# Patient Record
Sex: FEMALE | Race: WHITE | NOT HISPANIC OR LATINO | Employment: UNEMPLOYED | URBAN - NONMETROPOLITAN AREA
[De-identification: names, ages, dates, MRNs, and addresses within clinical notes are randomized per-mention and may not be internally consistent; named-entity substitution may affect disease eponyms.]

---

## 2021-04-16 ENCOUNTER — OFFICE VISIT CONVERTED (OUTPATIENT)
Dept: NEUROSURGERY | Facility: CLINIC | Age: 61
End: 2021-04-16
Attending: PHYSICIAN ASSISTANT

## 2021-05-11 NOTE — H&P
History and Physical      Patient Name: Anai Burden   Patient ID: [id number]   Sex: Female   YOB: 1960    Primary Care Provider: Elsa GRAHAM   Referring Provider: Elsa GRAHAM    Visit Date: April 16, 2021    Provider: Purvi Lilly PA-C   Location: [facility] [place]   Location Address: [address] [place] [address] [place], [place]  [id number]   Location Phone: [phone]          Chief Complaint     Low back pain and left leg weakness.       History Of Present Illness  The patient is a 60 year old /White female, who presents on referral from Elsa GRAHAM, for a neurosurgical evaluation of low back pain, right leg pain, and left leg pain.   The right leg pain involves the right foot in a nonspecific distribution. The left leg pain involves the left foot in a nonspecific distribution. The pain developed gradually around 6 months ago, but getting worse It is 8/10 in severity has an aching and a sharp quality and radiates into the bilateral foot in a nonspecific distribution. The pain has been constant. The pain tends to be maximal at no specific time, but waxes and wanes in severity throughout the day. The patient states the pain is aggravated by prolonged standing, walking long distances, staying in one position for extended periods, bending, and lifting. No alleviating factors are reported.   She also reports paresthesias in the right foot to include all of the toes and right leg feels weak at times. She denies urinary frequency and trouble emptying bladder. The patient's past medical history is notable for history of cervical and lumbar surgery. History of ACDF. Left L3/4 MID and right L4/5 MIL several years ago.   RECENT INTERVENTIONS:  She has been previously treated with chiropractic management and tramadol. The chiropractic treatments were transiently effective.   INFORMATION REVIEWED:  The following information was reviewed: radiology reports  and images. MRI of the lumbar spine and from [facility] (not on PACS) from June 2020 revealed left foraminal disc protrusion at L4/5 was the most notable finding with ddd at several levels.       Past Medical History  Anxiety; Carpal tunnel syndrome; Cervicalgia; Depression; Lumbago/low back pain; Lumbar Spinal Stenosis; Sciatica         Past Surgical History  Carpal Tunnel Release; Cervical fusion using anterior approach; Cholecystectomy; Hysterectomy; Lumbar laminectomy; Minimally invasive Discectomy         Medication List  gabapentin 300 mg oral capsule; tramadol 50 mg oral tablet; Trelegy Ellipta 100-62.5-25 mcg inhalation blister with device         Allergy List  hydrocodone-acetaminophen         Family Medical History  Diabetes, unspecified type; Heart Attack (MI)         Social History  Tobacco (Former)         Review of Systems  · Constitutional  o Admits  o : fatigue, weight gain  o Denies  o : chills, excessive sweating, fever, sycope/passing out, weight loss  · Eyes  o Denies  o : changes in vision, blurry vision, double vision  · HENT  o Admits  o : loss of hearing, seasonal allergies  o Denies  o : ringing in the ears, ear aches, sore throat, nasal congestion, sinus pain, nose bleeds  · Cardiovascular  o Denies  o : blood clots, swollen legs, anemia, easy burising or bleeding, transfusions  · Respiratory  o Admits  o : shortness of breath, COPD  o Denies  o : dry cough, productive cough, pneumonia  · Gastrointestinal  o Denies  o : difficulty swallowing, reflux  · Genitourinary  o Denies  o : incontinence  · Neurologic  o Admits  o : tremor  o Denies  o : headache, seizure, stroke, loss of balance, falls, dizziness/vertigo, difficulty with sleep, numbness/tingling/paresthesia , difficulty with coordination, difficulty with dexterity, weakness  · Musculoskeletal  o Admits  o : joint pain, weakness, sciatica, pain radiating in leg, low back pain  o Denies  o : neck stiffness/pain, swollen lymph  "nodes, muscle aches, pain radiating in arm  · Endocrine  o Denies  o : diabetes, thyroid disorder  · Psychiatric  o Denies  o : anxiety, depression  · All Others Negative      Vitals  Date Time BP Position Site L\R Cuff Size HR RR TEMP (F) WT  HT  BMI kg/m2 BSA m2 O2 Sat FR L/min FiO2 HC       04/16/2021 10:46 AM        96.3 191lbs 0oz 5'  5\" 31.78 1.99             Physical Examination     Motor 5/5  DTR 1/4  positive SLR on right   gait and station are wnl  ttp over the right SI joint           Assessment  · Degeneration of lumbar intervertebral disc     722.52/M51.36  · Lumbago/low back pain     724.3/M54.40  · Paresthesia     782.0/R20.2  · Radiculopathy, lumbosacral     724.4/M54.16      Plan  · Orders  o VIKTORIA REPORT (KASPR) - - 04/16/2021  o MRI lumbar spine w/w/o contrst (69061) - 724.3/M54.40, 724.4/M54.16, 782.0/R20.2, 722.52/M51.36 - 04/16/2021   [place]  o BUN (86221) - - 04/16/2021  o Creatinine blood (43296) - - 04/16/2021  · Medications  o tramadol 50 mg oral tablet   SIG: take 1-2 tablets by oral route TID as needed for pain   DISP: (84) Tablet with 0 refills  Adjusted on 04/16/2021     o Medications have been Reconciled  o Transition of Care or Provider Policy  · Instructions  o Encouraged to follow-up with Primary Care Provider for preventative care.  o The ROS and the PFSH were reviewed at today's visit.  o I have discussed the risks and benefits of surgery versus physical therapy, epidural steroids, and other conservative forms of treatment.  o Call or return to office if symptoms worsen or persist.            Electronically Signed by: Purvi Lilly PA-C -Author on April 16, 2021 11:10:13 AM  "

## 2021-05-14 VITALS — HEIGHT: 65 IN | WEIGHT: 191 LBS | TEMPERATURE: 96.3 F | BODY MASS INDEX: 31.82 KG/M2

## 2022-04-26 ENCOUNTER — TELEPHONE (OUTPATIENT)
Dept: NEUROSURGERY | Facility: CLINIC | Age: 62
End: 2022-04-26

## 2022-04-26 NOTE — TELEPHONE ENCOUNTER
Caller: GEREMIAS ROBERT     Relationship to patient: SELF    Best call back number: [phone]    Chief complaint: LOW BACK PAIN     Type of visit: FOLLOW UP EXT     Requested date: ASAP     If rescheduling, when is the original appointment: N/A     Additional notes: PATIENT IS CALLING REQUESTING TO SCHEDULE AN APPT FOR WORSENING LOW BACK PAIN. LAST SEEN 4/16/21 BY STORMY MALIN. PATIENT STATED SHE HAD AN MRI LUMBAR SPINE LAST YEAR @ [facility].     CALLED [facility], [facility] FOR THEM TO FAX MRI REPORT TO THE HUB. PATIENT STATED SHE HAS THE DISC.     HUB OKAY TO SCHED W MID LEVEL?

## 2022-05-02 ENCOUNTER — TELEPHONE (OUTPATIENT)
Dept: NEUROSURGERY | Facility: CLINIC | Age: 62
End: 2022-05-02

## 2022-05-02 NOTE — TELEPHONE ENCOUNTER
PATIENT CALLED TO RESCHEDULE HER FOLLOW UP APPT WITH STORMY DUONG. PATIENT STATED IT IS HER DAUGHTERS GRADUATION AND SHE HAS FAMILY COMING IN TOWN. SHE REQUESTED TO RESCHEDULE FOR June. RESCHEDULED HER TO 6/2/22.     DOCUMENTING PER HUB PROTOCOL.

## 2022-07-07 ENCOUNTER — OFFICE VISIT (OUTPATIENT)
Dept: NEUROSURGERY | Facility: CLINIC | Age: 62
End: 2022-07-07

## 2022-07-07 VITALS — HEIGHT: 65 IN | WEIGHT: 197 LBS | BODY MASS INDEX: 32.82 KG/M2

## 2022-07-07 DIAGNOSIS — M70.61 TROCHANTERIC BURSITIS OF BOTH HIPS: ICD-10-CM

## 2022-07-07 DIAGNOSIS — M70.62 TROCHANTERIC BURSITIS OF BOTH HIPS: ICD-10-CM

## 2022-07-07 DIAGNOSIS — Z98.890 HX OF LAMINECTOMY: ICD-10-CM

## 2022-07-07 DIAGNOSIS — M51.26 LUMBAR DISC HERNIATION: ICD-10-CM

## 2022-07-07 DIAGNOSIS — M47.27 OSTEOARTHRITIS OF SPINE WITH RADICULOPATHY, LUMBOSACRAL REGION: Primary | ICD-10-CM

## 2022-07-07 PROCEDURE — 99214 OFFICE O/P EST MOD 30 MIN: CPT | Performed by: NURSE PRACTITIONER

## 2022-07-07 RX ORDER — ARIPIPRAZOLE 10 MG/1
TABLET ORAL
COMMUNITY
Start: 2022-06-14

## 2022-07-07 RX ORDER — CELECOXIB 200 MG/1
CAPSULE ORAL
COMMUNITY
Start: 2022-06-14

## 2022-07-07 RX ORDER — PANTOPRAZOLE SODIUM 40 MG/1
TABLET, DELAYED RELEASE ORAL
COMMUNITY
Start: 2022-06-14

## 2022-07-07 RX ORDER — TRAMADOL HYDROCHLORIDE 50 MG/1
TABLET ORAL
COMMUNITY
Start: 2022-06-28

## 2022-07-07 RX ORDER — BUPROPION HYDROCHLORIDE 75 MG/1
TABLET ORAL
COMMUNITY
Start: 2022-05-23

## 2022-07-07 RX ORDER — GABAPENTIN 300 MG/1
CAPSULE ORAL
COMMUNITY
Start: 2022-06-21

## 2022-07-07 NOTE — PROGRESS NOTES
Chief Complaint  Back Pain and Leg Pain    Subjective          Anai Burden who is a 62 y.o. year old female who presents to [facility] for follow up worsening low back and leg apin.     She was last seen in our office in April of last year. She has a prior history of left MID L3/4 and right MIL L4/5 several years ago. An MRI Lumbar Spine was ordered at that time, demonstrating multilevel degenerative changes with a left paracentral disc protrusion at L4/5. She never followed up with our office following the imaging.     Her pain has worsened over the past month. Pain will radiate into the right leg, into the lateral thigh, calf, and side of the foot. Has some pain into the left leg though not as significant. She has concerns of weakness in the legs.     Her pain is primarily in the low back. Described as pressure. Prolonged sitting, standing and walking make her pain worse, bending and twisting. Rest will relieve her pain.     She is taking Tramadol and Gabapentin, which are no longer as effective as it once was.      Interval History per DELICIA Whitehead 4/16/21    The patient is a 60 year old /White female, who presents on referral from Elsa GRAHAM, for a neurosurgical evaluation of low back pain, right leg pain, and left leg pain.   The right leg pain involves the right foot in a nonspecific distribution. The left leg pain involves the left foot in a nonspecific distribution. The pain developed gradually around 6 months ago, but getting worse It is 8/10 in severity has an aching and a sharp quality and radiates into the bilateral foot in a nonspecific distribution. The pain has been constant. The pain tends to be maximal at no specific time, but waxes and wanes in severity throughout the day. The patient states the pain is aggravated by prolonged standing, walking long distances, staying in one position for extended periods, bending, and lifting. No  "alleviating factors are reported.   She also reports paresthesias in the right foot to include all of the toes and right leg feels weak at times. She denies urinary frequency and trouble emptying bladder. The patient's past medical history is notable for history of cervical and lumbar surgery. History of ACDF. Left L3/4 MID and right L4/5 MIL several years ago.   RECENT INTERVENTIONS:  She has been previously treated with chiropractic management and tramadol. The chiropractic treatments were transiently effective.   INFORMATION REVIEWED:  The following information was reviewed: radiology reports and images. MRI of the lumbar spine and from [facility] (not on PACS) from June 2020 revealed left foraminal disc protrusion at L4/5 was the most notable finding with ddd at several levels.     Recent Interventions: Tramadol, gabapentin      Review of Systems   Musculoskeletal: Positive for arthralgias, back pain and bursitis.   Neurological: Positive for weakness and numbness.   All other systems reviewed and are negative.       Objective   Vital Signs:   Ht 165.1 cm (65\")   Wt 89.4 kg (197 lb)   BMI 32.78 kg/m²       Physical Exam  Vitals reviewed.   Constitutional:       Appearance: Normal appearance.   Musculoskeletal:      Lumbar back: No tenderness. Negative right straight leg raise test and negative left straight leg raise test.      Right hip: Tenderness (trochanteric bursa TTP) present. Normal range of motion.      Left hip: Tenderness (trochanteric bursa TTP) present. Normal range of motion.   Neurological:      Mental Status: She is alert and oriented to person, place, and time.      Gait: Gait is intact.      Deep Tendon Reflexes: Strength normal.      Reflex Scores:       Patellar reflexes are 0 on the right side and 0 on the left side.       Achilles reflexes are 0 on the right side and 0 on the left side.       Neurologic Exam     Mental Status   Oriented to person, place, and time.   Level of " consciousness: alert    Motor Exam   Muscle bulk: normal  Overall muscle tone: normal    Strength   Strength 5/5 throughout.     Sensory Exam   Sensory deficit distribution on right: L5    Gait, Coordination, and Reflexes     Gait  Gait: normal    Reflexes   Right patellar: 0  Left patellar: 0  Right achilles: 0  Left achilles: 0  Right ankle clonus: absent  Left ankle clonus: absent       Result Review :       Data reviewed: Radiologic studies MRI Lumbar Spine on 5/12/21 at [facility] personally reviewed. Multilevel degenerative changes. Prior laminectomy L3/4 on the left and L4/5 on the right. There is a left parcentral disc protrusion at L4/5 with severe left lateral recess and foraminal stenosis. This is the most notable finding.          Assessment and Plan    Diagnoses and all orders for this visit:    1. Osteoarthritis of spine with radiculopathy, lumbosacral region (Primary)  -     MRI Lumbar Spine With & Without Contrast; Future    2. Lumbar disc herniation    3. Hx of laminectomy  -     MRI Lumbar Spine With & Without Contrast; Future    4. Trochanteric bursitis of both hips    Pt with history of prior lumbar spine surgeries, presenting with worsening pain in the back and right leg in an L5 distribution. We reviewed her MRI Lumbar Spine from over a year ago, demonstrating a left sided disc herniation at L4/5. We discussed that this would not explain her current symptoms well. Will proceed with MRI Lumbar Spine w/wo to evaluate for surgical intervention. She will follow up in 6 weeks.       Follow Up   Return in about 6 weeks (around 8/18/2022).  Patient was given instructions and counseling regarding her condition or for health maintenance advice.       -MRI Lumbar Spine w/wo  -Follow up in 6 weeks

## 2022-07-11 ENCOUNTER — TELEPHONE (OUTPATIENT)
Dept: NEUROSURGERY | Facility: CLINIC | Age: 62
End: 2022-07-11

## 2022-07-11 DIAGNOSIS — M51.26 LUMBAR DISC HERNIATION: Primary | ICD-10-CM

## 2022-08-09 DIAGNOSIS — M51.26 LUMBAR DISC HERNIATION: ICD-10-CM

## 2022-08-09 DIAGNOSIS — M47.27 OSTEOARTHRITIS OF SPINE WITH RADICULOPATHY, LUMBOSACRAL REGION: ICD-10-CM

## 2022-08-09 DIAGNOSIS — Z98.890 HX OF LAMINECTOMY: ICD-10-CM

## 2022-08-18 ENCOUNTER — OFFICE VISIT (OUTPATIENT)
Dept: NEUROSURGERY | Facility: CLINIC | Age: 62
End: 2022-08-18

## 2022-08-18 VITALS — BODY MASS INDEX: 33.15 KG/M2 | WEIGHT: 199 LBS | HEIGHT: 65 IN

## 2022-08-18 DIAGNOSIS — Z98.890 HX OF LAMINECTOMY: ICD-10-CM

## 2022-08-18 DIAGNOSIS — M70.61 TROCHANTERIC BURSITIS OF BOTH HIPS: ICD-10-CM

## 2022-08-18 DIAGNOSIS — M47.27 OSTEOARTHRITIS OF SPINE WITH RADICULOPATHY, LUMBOSACRAL REGION: ICD-10-CM

## 2022-08-18 DIAGNOSIS — M70.62 TROCHANTERIC BURSITIS OF BOTH HIPS: ICD-10-CM

## 2022-08-18 DIAGNOSIS — M54.16 LUMBAR RADICULOPATHY: Primary | ICD-10-CM

## 2022-08-18 PROCEDURE — 99214 OFFICE O/P EST MOD 30 MIN: CPT | Performed by: NURSE PRACTITIONER

## 2022-08-18 NOTE — PROGRESS NOTES
Chief Complaint  Back Pain, Hip Pain, and Leg Pain    Subjective          Anai Burden who is a 62 y.o. year old female who presents to [facility] for follow up of low back and right leg pain. MRI Lumbar Spine.    MRI Lumbar Spine on 8/8/22 at [place] personally reviewed. Disc bulge combined with facet arthropathy at L3/4 reuslting in moderate right neuralforaminal and lateral recess narrowing without significant canal stenosis. At L4/5 there are changes from prior laminectomy and moderate left neural foraminal narowing.    Pt's pain is primarily in the right hip radiating into the lateral thigh. Will occasional radiate into lateral calf. Pain is rather severe. She is limited in moving and rotating the hip, as well as raising the leg.     She continues Tramadol and Gabapentin, which are no longer as effective as they once were.       Interval History 7/7/22     Anai Burden who is a 62 y.o. year old female who presents to [facility] for follow up worsening low back and leg apin.      She was last seen in our office in April of last year. She has a prior history of left MID L3/4 and right MIL L4/5 several years ago. An MRI Lumbar Spine was ordered at that time, demonstrating multilevel degenerative changes with a left paracentral disc protrusion at L4/5. She never followed up with our office following the imaging.      Her pain has worsened over the past month. Pain will radiate into the right leg, into the lateral thigh, calf, and side of the foot. Has some pain into the left leg though not as significant. She has concerns of weakness in the legs.      Her pain is primarily in the low back. Described as pressure. Prolonged sitting, standing and walking make her pain worse, bending and twisting. Rest will relieve her pain.      She is taking Tramadol and Gabapentin, which are no longer as effective as it once  "was.     Recent Interventions: Tramadol and Gabapentin      Review of Systems   Musculoskeletal: Positive for arthralgias, back pain, gait problem and bursitis.   Neurological: Positive for weakness and numbness.   All other systems reviewed and are negative.       Objective   Vital Signs:   Ht 165.1 cm (65\")   Wt 90.3 kg (199 lb)   BMI 33.12 kg/m²       Physical Exam  Vitals reviewed.   Constitutional:       Appearance: Normal appearance.   Musculoskeletal:      Lumbar back: No tenderness. Negative right straight leg raise test and negative left straight leg raise test.      Right hip: Tenderness (trochanteric bursa TTP) present. Normal range of motion.      Left hip: Tenderness (trochanteric bursa TTP) present. Normal range of motion.   Neurological:      Mental Status: She is alert and oriented to person, place, and time.      Gait: Gait is intact.      Deep Tendon Reflexes: Strength normal.      Reflex Scores:       Patellar reflexes are 0 on the right side and 0 on the left side.       Achilles reflexes are 0 on the right side and 0 on the left side.       Neurologic Exam     Mental Status   Oriented to person, place, and time.   Level of consciousness: alert    Motor Exam   Muscle bulk: normal  Overall muscle tone: normal    Strength   Strength 5/5 throughout.     Sensory Exam   Light touch normal.     Gait, Coordination, and Reflexes     Gait  Gait: normal    Reflexes   Right patellar: 0  Left patellar: 0  Right achilles: 0  Left achilles: 0  Right ankle clonus: absent  Left ankle clonus: absent       Result Review :       Data reviewed: Radiologic studies MRI Lumbar Spine on 8/8/22 at [place] personally reviewed. Disc bulge combined with facet arthropathy at L3/4 reuslting in moderate right neuralforaminal and lateral recess narrowing without significant canal stenosis. At L4/5 there are changes from prior laminectomy and moderate left neural foraminal narowing.          Assessment and Plan    Diagnoses " and all orders for this visit:    1. Lumbar radiculopathy (Primary)  -     Cancel: Ambulatory Referral to Pain Management  -     Ambulatory Referral to Pain Management    2. Trochanteric bursitis of both hips  -     Ambulatory Referral to Pain Management    3. Osteoarthritis of spine with radiculopathy, lumbosacral region  -     Cancel: Ambulatory Referral to Pain Management  -     Ambulatory Referral to Pain Management    4. Hx of laminectomy  -     Cancel: Ambulatory Referral to Pain Management  -     Ambulatory Referral to Pain Management    We reviewed her MRI Lumbar Spine. She has some foraminal and lateral recess narrowing on the right at L3/4, though I am unsure this provides a great explanation for the severity of her pain. She has limited ROM in the hip and tenderness at the trochanteric bursa. She has received injections in this area with good relief. Will refer to pain management to discuss epidural vs trochanteric bursa injections, as well as medication management. Should hip pain persist, she may need to get back to Orthopedics for further evaluation. She will follow up as needed.       Follow Up   Return if symptoms worsen or fail to improve.  Patient was given instructions and counseling regarding her condition or for health maintenance advice.       -Referral to Pain management for injection  -Follow up as needed

## 2023-02-08 NOTE — TELEPHONE ENCOUNTER
Lab ordered please fax to [facility]   Rituxan Counseling:  I discussed with the patient the risks of Rituxan infusions. Side effects can include infusion reactions, severe drug rashes including mucocutaneous reactions, reactivation of latent hepatitis and other infections and rarely progressive multifocal leukoencephalopathy.  All of the patient's questions and concerns were addressed.

## 2023-04-14 ENCOUNTER — TELEPHONE (OUTPATIENT)
Dept: ORTHOPEDIC SURGERY | Facility: CLINIC | Age: 63
End: 2023-04-14
Payer: MEDICARE

## 2023-04-14 NOTE — TELEPHONE ENCOUNTER
Comments     INCOMING SELF REFERRAL FOR 2ND OPINION TO [facility] FOR FROM MRI REPORT 12/14/22 IN CHART  1. MACERATED APPEARANCE OF THE ANTERIOR ANTEROSUPERIOR LABRUM  2. MILD EDEMA INVOLVING THE AIIS ORIGIN OF THE RECTUS FEMORIS WHICH COULD REPRESENT LOW GRADE PARTIAL TEAR     PATIENT HAS SEEN [facility] IN [place], [place] UP TO 02/2023 APPROX   NO HX OF SX - PATIENT CALLED DR. MOODY TO HAVE RECORDS SENT. RECORDS ARE IN MEDIA FROM 12/2022 - SENT TO SCHED REVIEW RE: 2ND OPINION         DR OHWELL IS THIS SOMETHING YOU WILL TREAT?    Please advise

## 2023-05-03 DIAGNOSIS — M25.552 BILATERAL HIP PAIN: Primary | ICD-10-CM

## 2023-05-03 DIAGNOSIS — M25.551 BILATERAL HIP PAIN: Primary | ICD-10-CM

## 2023-05-04 ENCOUNTER — OFFICE VISIT (OUTPATIENT)
Dept: ORTHOPEDIC SURGERY | Facility: CLINIC | Age: 63
End: 2023-05-04
Payer: MEDICARE

## 2023-05-04 ENCOUNTER — HOSPITAL ENCOUNTER (OUTPATIENT)
Dept: GENERAL RADIOLOGY | Facility: HOSPITAL | Age: 63
Discharge: HOME | End: 2023-05-04
Payer: MEDICARE

## 2023-05-04 VITALS — WEIGHT: 193 LBS | HEIGHT: 65 IN | TEMPERATURE: 98.6 F | BODY MASS INDEX: 32.15 KG/M2

## 2023-05-04 DIAGNOSIS — M25.551 BILATERAL HIP PAIN: ICD-10-CM

## 2023-05-04 DIAGNOSIS — M25.551 RIGHT HIP PAIN: Primary | ICD-10-CM

## 2023-05-04 DIAGNOSIS — M25.552 LEFT HIP PAIN: ICD-10-CM

## 2023-05-04 DIAGNOSIS — M25.552 BILATERAL HIP PAIN: ICD-10-CM

## 2023-05-04 PROCEDURE — 73521 X-RAY EXAM HIPS BI 2 VIEWS: CPT

## 2023-05-04 NOTE — PROGRESS NOTES
"Chief Complaint  Pain and Establish Care of the Right Hip    Subjective    History of Present Illness      Anai Burden is a 63 y.o. female who presents to [facility] for chronic low back pain and bilateral hip pain.  History of Present Illness this is a patient who has been having increasing pain and discomfort on both her hips.  The right side is more symptomatic than the left.  She has had increasing pain and discomfort over the past few months.  She states that the pain is 6 on a scale of 1-10.  The pain is associated with discomfort on rotation of the hips.  She has difficulty going up and down the steps.  She has a sense of clicking and popping in the hip joint.  Symptoms have become worse over the past 5 months or so.  She rates her pain as an 8 on a scale of 1-10.  There are no risk factors for avascular necrosis.  Symptoms are worse when she is laying in a decubitus position.  She has been using meloxicam for pain and discomfort.  She has also had a very long history of chronic low back pain.  She has had multiple surgical intervention for the lumbar spine.  She has also had LESI in the pain clinic which do help her symptoms to some degree.  She is here for second opinion and this is a very complex setting.  She has been initially seen by Dr. Raj White and by Dr. Theo Kumari as well.  Pain Location: BILATERAL hip  Radiation: From the lumbar spine into the hip buttock and thigh.  Quality: dull, aching  Intensity/Severity: moderate to severe  Duration: 5 months  Progression of symptoms: yes, progressive worsening  Onset quality: gradual   Timing: intermittent  Aggravating Factors: kneeling, rising after sitting  Alleviating Factors: NSAIDs  Previous Episodes: yes  Associated Symptoms: clicking/popping  ADLs Affected: work related activities, recreational activities/sports  Previous Treatment: NSAIDs       Objective   Vital Signs:   Temp 98.6 °F (37 °C)   Ht 165.1 cm (65\")  "  Wt 87.5 kg (193 lb)   BMI 32.12 kg/m²     Physical Exam  Physical Exam  Vitals signs and nursing note reviewed.   Constitutional:       Appearance: Normal appearance.   Pulmonary:      Effort: Pulmonary effort is normal.   Skin:     General: Skin is warm and dry.      Capillary Refill: Capillary refill takes less than 2 seconds.   Neurological:      General: No focal deficit present.      Mental Status: He is alert and oriented to person, place, and time. Mental status is at baseline.   Psychiatric:         Mood and Affect: Mood normal.         Behavior: Behavior normal.         Thought Content: Thought content normal.         Judgment: Judgment normal.     Ortho Exam   Bilateral SI JOINT: Mild tenderness is present dorsally over the SI joint. Figure of 4 sign is positive. There is mild spasm present in the erector spinae muscle. Flexion and extension are associated with discomfort. Deep tendon reflexes are intact and symmetrical on both lower extremities. Pain radiates into the buttock on extension of the hip. No evidence of cauda equina syndrome. No motor or sensory deficit is noted. There is no bowel or bladder involvement.  Bilateral hip (gtb): Skin and soft tissues over the greater trochanteric bursa are tender upon palpation, along with IT band tenderness. Cross body adduction is negative. Internal and external rotations are bothersome and cause tenderness over the greater trochanter. There is no clinical deformity and no shortening. She does have a limp upon walking. There is piriformis tightness and there  is capsular tightness with any rotation. Dorsalis pedis and posterior tibial artery pulses are palpable. Neurovascular status is intact and capillary refill is less than 2 seconds. Common peroneal nerve function is well preserved.            Result Review :   The following data was reviewed by: Everardo Gonsalez MD on 05/04/2023:  Radiologic studies - see below for interpretation  xrays obtained  today    bilateral Hip X-Ray  Indication: Evaluation of pain and discomfort in both the hips.  AP and lateral  Findings: Some narrowing of the joint space is noted laterally.  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  no bony lesion  Soft tissues decreased  decreased joint spaces  Hardware appropriately positioned not applicable      no prior studies available for comparison.    X-RAY was ordered and reviewed by Everardo Gonsalez MD      Reviewed MRI report of right hip, performed at  [facility] on 12/04/2022, summary of impression below:    MRI reports of the hip are available in the office today.  There is maceration of the anterior labrum as well as superior labrum.  There is moderate thinning of the articular cartilage along the acetabular roof with some subchondral cystic changes noted.  The ligamentum teres is intact.  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  There might be a low-grade tear of the rectus femoris as well.        Procedures           Assessment   Assessment and Plan    Diagnoses and all orders for this visit:    1. Right hip pain (Primary)    2. Left hip pain          Follow Up   · I do not believe that she is ready for total hip arthroplasty just yet and we have discussed that with the patient at length.  · Calcium and vitamin D for bone health.  · Intra-articular steroid injection and viscosupplementation injections discussed with the patient.  · I am going to refer her to Dr. 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  · MATTHIAS at the pain clinic discussed with the patient as well.  If she is declined by the hip arthroscopy surgeon for any surgical intervention there may be a possibility of consideration for hip arthroplasty only after her symptoms have accelerated.  · Rest, ice,  compression, and elevation (RICE) therapy  · Stretching and strengthening exercises  · OTC Alternate Ibuprofen and Tylenol as needed  · Refer to Dr. Kryee Estrada  • Patient was given instructions and counseling regarding her condition or for health maintenance advice. Please see specific information pulled into the AVS if appropriate.     Everardo Gonsalez MD   Date of Encounter: 5/4/2023   Electronically signed by Everardo Gonsalez MD, 05/04/23, 2:52 PM EDT.     EMR Dragon/Transcription disclaimer:  Much of this encounter note is an electronic transcription/translation of spoken language to printed text. The electronic translation of spoken language may permit erroneous, or at times, nonsensical words or phrases to be inadvertently transcribed; Although I have reviewed the note for such errors, some may still exist.

## 2023-05-15 DIAGNOSIS — M25.551 BILATERAL HIP PAIN: Primary | ICD-10-CM

## 2023-05-15 DIAGNOSIS — M25.552 BILATERAL HIP PAIN: Primary | ICD-10-CM

## 2023-05-21 PROBLEM — M25.552 LEFT HIP PAIN: Status: ACTIVE | Noted: 2023-05-21

## 2023-05-21 PROBLEM — M25.551 RIGHT HIP PAIN: Status: ACTIVE | Noted: 2023-05-21

## 2025-02-04 ENCOUNTER — OFFICE VISIT (OUTPATIENT)
Dept: NEUROSURGERY | Facility: CLINIC | Age: 65
End: 2025-02-04
Payer: MEDICARE

## 2025-02-04 VITALS
SYSTOLIC BLOOD PRESSURE: 134 MMHG | OXYGEN SATURATION: 98 % | BODY MASS INDEX: 32.32 KG/M2 | DIASTOLIC BLOOD PRESSURE: 71 MMHG | HEIGHT: 65 IN | HEART RATE: 74 BPM | WEIGHT: 194 LBS

## 2025-02-04 DIAGNOSIS — Z98.890 HISTORY OF LUMBAR SURGERY: ICD-10-CM

## 2025-02-04 DIAGNOSIS — F40.240 CLAUSTROPHOBIA: ICD-10-CM

## 2025-02-04 DIAGNOSIS — M47.27 OSTEOARTHRITIS OF SPINE WITH RADICULOPATHY, LUMBOSACRAL REGION: Primary | ICD-10-CM

## 2025-02-04 RX ORDER — HYDROCODONE BITARTRATE AND ACETAMINOPHEN 5; 325 MG/1; MG/1
1 TABLET ORAL EVERY 8 HOURS PRN
Qty: 21 TABLET | Refills: 0 | Status: SHIPPED | OUTPATIENT
Start: 2025-02-04

## 2025-02-04 RX ORDER — PRAMOXINE HYDROCHLORIDE HYDROCORTISONE ACETATE 100; 100 MG/10G; MG/10G
1 AEROSOL, FOAM TOPICAL 2 TIMES DAILY
COMMUNITY

## 2025-02-04 RX ORDER — CLOTRIMAZOLE AND BETAMETHASONE DIPROPIONATE 10; .64 MG/G; MG/G
1 CREAM TOPICAL 2 TIMES DAILY
COMMUNITY

## 2025-02-04 RX ORDER — DIAZEPAM 5 MG/1
TABLET ORAL
Qty: 2 TABLET | Refills: 0 | Status: SHIPPED | OUTPATIENT
Start: 2025-02-04

## 2025-02-04 RX ORDER — ACETAMINOPHEN 325 MG/1
325 TABLET ORAL EVERY 6 HOURS PRN
COMMUNITY

## 2025-02-04 RX ORDER — FLUTICASONE PROPIONATE 50 MCG
2 SPRAY, SUSPENSION (ML) NASAL DAILY
COMMUNITY

## 2025-02-04 RX ORDER — HYDROXYCHLOROQUINE SULFATE 200 MG/1
200 TABLET, FILM COATED ORAL 2 TIMES DAILY
COMMUNITY
Start: 2024-11-19

## 2025-02-04 RX ORDER — ALBUTEROL SULFATE 0.83 MG/ML
2.5 SOLUTION RESPIRATORY (INHALATION)
COMMUNITY

## 2025-02-04 RX ORDER — MAGNESIUM OXIDE 400 MG/1
400 TABLET ORAL DAILY
COMMUNITY

## 2025-02-04 NOTE — PATIENT INSTRUCTIONS
-MRI Lumbar Spine w/wo  -Valium 5 mg prior to procedure, may repeat if needed  -Pt will need  for the MRI  -Norco 5/325 mg every 8 hours as needed for pain  -Follow up after MRI

## 2025-02-05 ENCOUNTER — TELEPHONE (OUTPATIENT)
Dept: NEUROSURGERY | Facility: CLINIC | Age: 65
End: 2025-02-05
Payer: MEDICARE

## 2025-02-05 DIAGNOSIS — Z01.812 PRE-PROCEDURE LAB EXAM: Primary | ICD-10-CM

## 2025-02-17 ENCOUNTER — TELEPHONE (OUTPATIENT)
Dept: NEUROSURGERY | Facility: CLINIC | Age: 65
End: 2025-02-17
Payer: MEDICARE

## 2025-02-17 DIAGNOSIS — Z01.812 PRE-PROCEDURE LAB EXAM: Primary | ICD-10-CM

## 2025-02-17 NOTE — TELEPHONE ENCOUNTER
Caller: JOSE ENRIQUE @ [facility]     Relationship:     Best call back number: [phone]    What was the call regarding: JOSE ENRIQUE @ [facility] CALLED STATING THE PATIENT IS NEEDING AN ORDER FOR CREATINE BEFORE HIS MRI TOMORROW ON 02.18.25 @ [facility] - PLEASE FAX TO # [phone]    THANK YOU

## 2025-03-19 ENCOUNTER — TELEPHONE (OUTPATIENT)
Dept: NEUROSURGERY | Facility: CLINIC | Age: 65
End: 2025-03-19
Payer: MEDICARE

## 2025-03-19 NOTE — TELEPHONE ENCOUNTER
Patient returned call and stated she is not able to afford right now, but will try to reschedule soon.

## 2025-03-19 NOTE — TELEPHONE ENCOUNTER
Left message regarding overdue MRI and lab order that was scheduled at [facility]. Per [facility], patient has not had any recent testing.

## 2025-08-12 ENCOUNTER — OUTSIDE FACILITY SERVICE (OUTPATIENT)
Dept: CARDIOLOGY | Facility: CLINIC | Age: 65
End: 2025-08-12
Payer: MEDICARE

## 2025-08-12 PROCEDURE — 93306 TTE W/DOPPLER COMPLETE: CPT | Performed by: INTERNAL MEDICINE
